# Patient Record
Sex: FEMALE | Race: BLACK OR AFRICAN AMERICAN | Employment: FULL TIME | URBAN - METROPOLITAN AREA
[De-identification: names, ages, dates, MRNs, and addresses within clinical notes are randomized per-mention and may not be internally consistent; named-entity substitution may affect disease eponyms.]

---

## 2020-08-19 ENCOUNTER — APPOINTMENT (OUTPATIENT)
Dept: PHYSICAL THERAPY | Age: 75
End: 2020-08-19

## 2020-08-19 ENCOUNTER — HOSPITAL ENCOUNTER (OUTPATIENT)
Dept: PHYSICAL THERAPY | Age: 75
Discharge: HOME | End: 2020-08-19
Payer: MEDICARE

## 2020-08-19 PROCEDURE — 97110 THERAPEUTIC EXERCISES: CPT

## 2020-08-19 PROCEDURE — 97162 PT EVAL MOD COMPLEX 30 MIN: CPT

## 2020-08-19 NOTE — PROGRESS NOTES
[address] [facility] AT [address], [address] [place] [address] Road  Phone: [phone]   [phone]  PLAN OF CARE / [address] [place] [address] PHYSICAL THERAPY SERVICES  Patient Name: Caroline Miranda : 1945   Medical   Diagnosis: Low back pain [M54.5]  Left shoulder pain [M25.512]  Left wrist pain [M25.532] Treatment Diagnosis: Low back pain [M54.5]  Left shoulder pain [M25.512]  Left wrist pain [M25.532]   Onset Date: 5 years     Referral Source: Chaim Sacks, NP Start of Care [facility]): 2020   Prior Hospitalization: See medical history Provider #: [id number]   Prior Level of Function: Unlimited tolerance for standing/walking activities. Walk 1/2 hour on TM 3 x/wk   Comorbidities: Psoriasis, arthritis, HTN, concurrent neck and left shoulder pain, BMI>30   Medications: Verified on Patient Summary List   The Plan of Care and following information is based on the information from the initial evaluation.   ===========================================================================================  Assessment / key information:  Pt is a 76y.o. year old female with subjective complaints of insidious onset of chronic b/l LE numbness/tingling. Pt states ~ 5 years ago N/T started up in her thighs and then gradually progressed down her legs so that now symptoms reside mostly in her b/l toes and feet; sx's worsen with standing/walking. Pt states occasionally sx's may progress to the point of noticing b/l LE weakness and her knees have buckled on her on one occasion. She states she has had recent MRI/CT of her l/s which was reported to be (+) for \"narrowing of the spine\". Pt states she discussed surgical intervention with her MD, however prefers to treat non-conservatively at this time. Pt denies red flags. Pt denies pain. FOTO score= 73/100 indicating 27% impairment to functional activities.     Today's evaulation is significant for     LUMBAR EXAM:  1) POSTURE/OBSERVATION: min decrease in l/s lordosis noted in standing. 2) ROM:  Lumbar AROM WNL and pain free all planes. Hip A/PROM grossly WNL. 3) STRENGTH  Hip flex R 4/5, L 4/5; ext R 4-/5, L 4-/5; abd R 3+/5, L 3+/5; Knee Ext R 5/5, L 5/5; flex R 5/5, L 5/5; Ankle DF R 5/5, L 5/5. Bridge 50% limited by weakness. Poor isometric TA in hooklying    4) SPECIAL TESTS: (-) SLUMP, SLR, GARRY, piriformis tension. 5) ADDITIONAL FINDINGS: mild hypertonicity b/l l/s paraspinals. P/A glides grade 3 lumbar spine A/P. Light touch intact b/l LE's. Repeated movement testing indicates flexion bias. These findings are supportive for diagnosis of lumbago; likely spinal stenosis. Pt will be a good candidate for skilled PT to address these impairments and promote return to normal ADLs and functional mobility for improved quality of life. *Pt has additional referrals for Left shoulder and wrist pain. Pt requests initial treatment focus primarily on her low back treatment. Will plan appropriate evaluation of left shoulder c/o when lumbar POC completed. Will recommend referral to hand specialist for Left wrist pain.    ===========================================================================================  Eval Complexity: History HIGH Complexity :3+ comorbidities / personal factors will impact the outcome/ POC ;  Examination  MEDIUM Complexity : 3 Standardized tests and measures addressing body structure, function, activity limitation and / or participation in recreation ; Presentation MEDIUM Complexity : Evolving with changing characteristics ;   Decision Making MEDIUM Complexity : FOTO score of 26-74; Overall Complexity MEDIUM  Problem List: pain affecting function, decrease ROM, decrease strength, impaired gait/ balance, decrease ADL/ functional abilitiies, decrease activity tolerance, decrease flexibility/ joint mobility and decrease transfer abilities   Treatment Plan may include any combination of the following: Therapeutic exercise, Therapeutic activities, Neuromuscular re-education, Physical agent/modality, Gait/balance training, Manual therapy, Patient education, Self Care training and Functional mobility training  Patient / Family readiness to learn indicated by: asking questions, trying to perform skills and interest  Persons(s) to be included in education: patient (P)  Barriers to Learning/Limitations: None  Measures taken, if barriers to learning:    Patient Goal (s): \"being able to walk\"   Patient self reported health status: good  Rehabilitation Potential: good   Short Term Goals: To be accomplished in  2  weeks:  1. Pt will be educated in appropriate HEP to decrease pain, increase ROM, increase strength and return pt to PLOF. 2. Pt will demo good isometric TA in H/L for improved axial stability with standing/walking activities. 3.  Pt will report at least 25% improvement in frequency/intensity of LE radicular symptoms with ADL's.  Long Term Goals: To be accomplished in  4-6  weeks:  1. Pt will improve FOTO score to >/= 77 to demo a significant improvement in functional activity tolerance. 2. Pt will achieve >/= +5 GROC in order to promote increased activity tolerance. 3. Patient will report at least 50% functional improvement with standing, walking ADL's for improved ease to resume recreational walking activity. Frequency / Duration:   Patient to be seen  1-2  times per week for 4-6  weeks:  Patient / Caregiver education and instruction: self care, activity modification and exercises  Therapist Signature: Aleena Pierre, PT Date: 7/97/2250   Certification Period: 08/19/20 to 11/17/2020 Time: 9:41 AM   ===========================================================================================  I certify that the above Physical Therapy Services are being furnished while the patient is under my care.   I agree with the treatment plan and certify that this therapy is necessary. Physician Signature:        Date:       Time:     Please sign and return to [facility] UNIT or you may fax the signed copy to [phone]. Thank you.

## 2020-08-19 NOTE — PROGRESS NOTES
PHYSICAL THERAPY - DAILY TREATMENT NOTE    Patient Name: Inés Farrell        Date: 2020  : 1945   yes Patient  Verified  Visit #:   1     Insurance: Payor: Alfa Foster / Plan: [address] [place] [address] HMO / Product Type: Managed Care Medicare /      In time: 1:50 PM Out time: 2:48 PM   Total Treatment Time: 58     Medicare/BC Time Tracking (below)   Total Timed Codes (min):  58 1:1 Treatment Time:  58     TREATMENT AREA =  Low back pain [M54.5]  Left shoulder pain [M25.512]  Left wrist pain [M25.532]    SUBJECTIVE  Pain Level (on 0 to 10 scale):  0  / 10   Medication Changes/New allergies or changes in medical history, any new surgeries or procedures?    no  If yes, update Summary List   Subjective Functional Status/Changes:  []  No changes reported     See POC          OBJECTIVE    See exam on chart for details on objective findings          15 min Therapeutic Exercise:  [x]  See flow sheet   Rationale:      increase ROM, increase strength and improve coordination to improve the patients ability to tolerate standing/walking activities. Billed With/As:   [x] TE   [] TA   [] Neuro   [] Self Care Patient Education: [x] Review HEP    [] Progressed/Changed HEP based on:   [] positioning   [] body mechanics   [] transfers   [] heat/ice application    [] other:      Other Objective/Functional Measures:    Review HEP, handout created and issued to pt. as per chart. Pt educated in spinal anatomy, function and dysfunction as related to diagnosis Reviewed POC and goals. Pt reports understanding.        Post Treatment Pain Level (on 0 to 10) scale:   0  / 10     ASSESSMENT  Assessment/Changes in Function:     See POC     []  See Progress Note/Recertification   Patient will continue to benefit from skilled PT services to modify and progress therapeutic interventions, address functional mobility deficits, address ROM deficits, address strength deficits, analyze and address soft tissue restrictions, analyze and cue movement patterns, analyze and modify body mechanics/ergonomics, assess and modify postural abnormalities and instruct in home and community integration to attain remaining goals. Progress toward goals / Updated goals:    See POC     PLAN  []  Upgrade activities as tolerated yes Continue plan of care   []  Discharge due to :    []  Other:      Therapist: Soham Cortes.  Francisco Hannon, PT    Date: 8/19/2020 Time: 9:40 AM     Future Appointments   Date Time Provider Felicitas Macdonald   8/25/2020  3:30 PM Ida Leger, PT [facility]   8/28/2020  2:00 PM Ana María Flor, PTA [facility]   8/31/2020  1:45 PM Ida Mayo., PT [facility]   9/2/2020  2:45 PM Ida Leger, PT [facility]

## 2020-08-24 ENCOUNTER — APPOINTMENT (OUTPATIENT)
Dept: PHYSICAL THERAPY | Age: 75
End: 2020-08-24

## 2020-08-25 ENCOUNTER — HOSPITAL ENCOUNTER (OUTPATIENT)
Dept: PHYSICAL THERAPY | Age: 75
Discharge: HOME | End: 2020-08-25
Payer: MEDICARE

## 2020-08-25 PROCEDURE — 97110 THERAPEUTIC EXERCISES: CPT

## 2020-08-25 NOTE — PROGRESS NOTES
PHYSICAL THERAPY - DAILY TREATMENT NOTE    Patient Name: South Spann        Date: 2020  : 1945   yes Patient  Verified  Visit #:   2   of     Insurance: Payor: Jeb Gitelman / Plan: [address] [place] [address] HMO / Product Type: Managed Care Medicare /      In time: 3:30 PM Out time: 4:25   Total Treatment Time: 55     Medicare/[facility] Time Tracking (below)   Total Timed Codes (min):  55 1:1 Treatment Time:  55     TREATMENT AREA =  Low back pain [M54.5]  Left shoulder pain [M25.512]  Left wrist pain [M25.532]    SUBJECTIVE  Pain Level (on 0 to 10 scale):  0  / 10;  Reports N/T to b/l feet 4/10   Medication Changes/New allergies or changes in medical history, any new surgeries or procedures?    no  If yes, update Summary List   Subjective Functional Status/Changes:  []  No changes reported     Pt states performing HEP 1-2 x/day without noting any change to sx's. OBJECTIVE      55 min Therapeutic Exercise:  [x]  See flow sheet   Rationale:      increase ROM, increase strength and improve coordination to improve the patients ability to tolerate standing/walking activities. Billed With/As:   [x] TE   [] TA   [] Neuro   [] Self Care Patient Education: [x] Review HEP    [] Progressed/Changed HEP based on:   [] positioning   [] body mechanics   [] transfers   [] heat/ice application    [] other:      Other Objective/Functional Measures:  -pt brings MRI reports (see chart copy) indicating mod/severe stenosis to lower lumbar levels. -new exercises added as per flow sheet with vc's provided for form/technique 100% of the time. -poor tolerance for recumbent bike with c/o fatigue after 1 min 40\"  -poor TA requiring heavy vc's to encourage muscle activation         Post Treatment Pain Level (on 0 to 10) scale:   0  / 10     ASSESSMENT  Assessment/Changes in Function:     Pt reports improved N/T to b/l feet after exercises indicating positive response to flexion bias exercises. After training, pt demonstrating improved TA activation, however continues to require supervision for appropriate muscle recruitment and performance. []  See Progress Note/Recertification   Patient will continue to benefit from skilled PT services to modify and progress therapeutic interventions, address functional mobility deficits, address ROM deficits, address strength deficits, analyze and address soft tissue restrictions, analyze and cue movement patterns, analyze and modify body mechanics/ergonomics, assess and modify postural abnormalities and instruct in home and community integration to attain remaining goals. Progress toward goals / Updated goals:    1st f/u; no significant changes yet     PLAN  []  Upgrade activities as tolerated yes Continue plan of care   []  Discharge due to :    []  Other:      Therapist: Edy Delgado.  Aby Nino, PT    Date: 8/25/2020 Time: 4:27 PM      Future Appointments   Date Time Provider Felicitas Macdonald   8/25/2020  3:30 PM Gar Halsted., PT [facility]   8/28/2020  2:00 PM Mana Cruz, PTA [facility]   8/31/2020  1:45 PM Gar Halsted., PT [facility]   9/2/2020  2:45 PM Gar Halsted., PT [facility]

## 2020-08-28 ENCOUNTER — HOSPITAL ENCOUNTER (OUTPATIENT)
Dept: PHYSICAL THERAPY | Age: 75
Discharge: HOME | End: 2020-08-28
Payer: MEDICARE

## 2020-08-28 PROCEDURE — 97110 THERAPEUTIC EXERCISES: CPT

## 2020-08-28 NOTE — PROGRESS NOTES
PT DAILY TREATMENT NOTE     Patient Name: Inés Farrell  Date:2020  : 1945  [x]  Patient  Verified  Payor: Alfa Foster / Plan: [address] [place] [address] HMO / Product Type: Managed Care Medicare /    In time:2:00  Out time:2:45  Total Treatment Time (min): 45  Total Timed Codes (min): 45  1:1 Treatment Time (min): 45   Visit #: 3 of     Treatment Area: Low back pain [M54.5]  Left shoulder pain [M25.512]  Left wrist pain [M25.532]    SUBJECTIVE  Pain Level (0-10 scale): 0  Any medication changes, allergies to medications, adverse drug reactions, diagnosis change, or new procedure performed?: [x] No    [] Yes (see summary sheet for update)  Subjective functional status/changes:   [] No changes reported  I keep telling everybody that I don't have any pain, it's just the numbness in my toes and it goes into my feet when I do a lot of walking. OBJECTIVE      45 min Therapeutic Exercise:  [x] See flow sheet :   Rationale: increase ROM and increase strength to improve the patients ability to improve functional abilities            min Patient Education: [x] Review HEP    [] Progressed/Changed HEP based on:   [] positioning   [] body mechanics   [] transfers   [] heat/ice application        Other Objective/Functional Measures:     Pain Level (0-10 scale) post treatment: 0    ASSESSMENT/Changes in Function: Pt presented with chief c/o continued LE digit numbness/paresthesia which radiates into her feet with prolonged standing/walking. Pt was able to advance to level 1 dead bug stabs with min to mod challenge today. Will continue to progress/advance patient within current POC as tolerated with monitoring symptoms.     Patient will continue to benefit from skilled PT services to modify and progress therapeutic interventions, address functional mobility deficits, address ROM deficits, address strength deficits, analyze and cue movement patterns, analyze and modify body mechanics/ergonomics, assess and modify postural abnormalities and instruct in home and community integration to attain remaining goals. []  See Plan of Care  []  See progress note/recertification  []  See Discharge Summary         Progress towards goals / Updated goals: · Short Term Goals: To be accomplished in  2  weeks:  1. Pt will be educated in appropriate HEP to decrease pain, increase ROM, increase strength and return pt to PLOF.8/28/20: Met    2. Pt will demo good isometric TA in H/L for improved axial stability with standing/walking activities. 3.  Pt will report at least 25% improvement in frequency/intensity of LE radicular symptoms with ADL's.      · Long Term Goals: To be accomplished in  4-6  weeks:  1. Pt will improve FOTO score to >/= 77 to demo a significant improvement in functional activity tolerance. 2. Pt will achieve >/= +5 GROC in order to promote increased activity tolerance. 3. Patient will report at least 50% functional improvement with standing, walking ADL's for improved ease to resume recreational walking activity.     PLAN  [x]  Upgrade activities as tolerated     []  Continue plan of care  []  Update interventions per flow sheet       []  Discharge due to:_  []  Other:_      Miles Harris, PTA 8/28/2020  2:06 PM      Future Appointments   Date Time Provider Fleicitas Macdonald   8/31/2020  1:45 PM Hammad Wise, PT [facility]   9/2/2020  2:45 PM Michi GALLEGOS, PT [facility]

## 2020-08-31 ENCOUNTER — HOSPITAL ENCOUNTER (OUTPATIENT)
Dept: PHYSICAL THERAPY | Age: 75
Discharge: HOME | End: 2020-08-31
Payer: MEDICARE

## 2020-08-31 PROCEDURE — 97110 THERAPEUTIC EXERCISES: CPT

## 2020-08-31 NOTE — PROGRESS NOTES
PT DAILY TREATMENT NOTE     Patient Name: Ruth Osorio  Date:2020  : 1945  [x]  Patient  Verified  Payor: Marbella Person / Plan: [address] [place] [address] HMO / Product Type: Managed Care Medicare /    In time:  1:44 PM  Out time:  2:30  Total Treatment Time (min): 46  Total Timed Codes (min): 46  1:1 Treatment Time (min): 46   Visit #: 4 of     Treatment Area: Low back pain [M54.5]  Left shoulder pain [M25.512]  Left wrist pain [M25.532]    SUBJECTIVE  Pain Level (0-10 scale): 0/10  Any medication changes, allergies to medications, adverse drug reactions, diagnosis change, or new procedure performed?: [x] No    [] Yes (see summary sheet for update)  Subjective functional status/changes:   [] No changes reported  \"I just get the numbness in my feet when I do too much walking\". States she did a little of her HEP at work. \"I did a lot of walking at work\"        OBJECTIVE    46 min Therapeutic Exercise:  [x] See flow sheet :   Rationale: increase ROM and increase strength to improve the patients ability to improve functional abilities            min Patient Education: [x] Review HEP    [] Progressed/Changed HEP based on:   [] positioning   [] body mechanics   [] transfers   [] heat/ice application        Other Objective/Functional Measures:   -added SB pelvic rocks and marches (with slight PPT)  -progressed PPT supine to [place] MB flexion  -progressed iso TA to RSB  -updated HEP handout (see chart copy)    Pain Level (0-10 scale) post treatment: 0 / 10    ASSESSMENT/Changes in Function: Pt demonstrating improving lumbo-pelvic stability with PPT as per ability to advance with exercises as per above. Pt demonstrating Fair TA with iso's.   Pt reporting no N/T to toes after treatment; pt re-ed on importance of compliance with HEP to promote carryover    Patient will continue to benefit from skilled PT services to modify and progress therapeutic interventions, address functional mobility deficits, address ROM deficits, address strength deficits, analyze and cue movement patterns, analyze and modify body mechanics/ergonomics, assess and modify postural abnormalities and instruct in home and community integration to attain remaining goals. []  See Plan of Care  []  See progress note/recertification  []  See Discharge Summary         Progress towards goals / Updated goals: · Short Term Goals: To be accomplished in  2  weeks:  1. Pt will be educated in appropriate HEP to decrease pain, increase ROM, increase strength and return pt to PLOF.8/28/20: Met    2. Pt will demo good isometric TA in H/L for improved axial stability with standing/walking activities. 3.  Pt will report at least 25% improvement in frequency/intensity of LE radicular symptoms with ADL's.      · Long Term Goals: To be accomplished in  4-6  weeks:  1. Pt will improve FOTO score to >/= 77 to demo a significant improvement in functional activity tolerance. 2. Pt will achieve >/= +5 GROC in order to promote increased activity tolerance. 3. Patient will report at least 50% functional improvement with standing, walking ADL's for improved ease to resume recreational walking activity. PLAN  [x]  Upgrade activities as tolerated     []  Continue plan of care  []  Update interventions per flow sheet       []  Discharge due to:_  []  Other:_      Pro Pierre, PT 8/31/2020  2:31 PM       Future Appointments   Date Time Provider Felicitas Macdonald   9/2/2020  2:45 PM Kailey Dewey, PT [facility]

## 2020-09-02 ENCOUNTER — HOSPITAL ENCOUNTER (OUTPATIENT)
Dept: PHYSICAL THERAPY | Age: 75
Discharge: HOME | End: 2020-09-02
Payer: MEDICARE

## 2020-09-02 PROCEDURE — 97110 THERAPEUTIC EXERCISES: CPT

## 2020-09-02 NOTE — PROGRESS NOTES
PT DAILY TREATMENT NOTE     Patient Name: Shelia Glynn  Date:2020  : 1945  [x]  Patient  Verified  Payor: Wagner Naina / Plan: [address] [place] [address] HMO / Product Type: Managed Care Medicare /    In time:  2:51 PM  Out time:  3:36  Total Treatment Time (min): 45  Total Timed Codes (min): 45  1:1 Treatment Time (min): 45   Visit #: 5 of     Treatment Area: Low back pain [M54.5]  Left shoulder pain [M25.512]  Left wrist pain [M25.532]    SUBJECTIVE  Pain Level (0-10 scale): 0 /10;  N/T to feet 4/10  Any medication changes, allergies to medications, adverse drug reactions, diagnosis change, or new procedure performed?: [x] No    [] Yes (see summary sheet for update)  Subjective functional status/changes:   [] No changes reported    \"My back bothered me for a couple of hours after the last session\". Pt unsure how long N/T was improved for after her last session. Pt states no pain with her HEP; states she has an improvement in her N/T to feet afterwards. \"It is getting better b/c I used to have to wear socks to bed b/c the tingling was so bad but I haven't had to do that lately\". OBJECTIVE    45 min Therapeutic Exercise:  [x] See flow sheet :   Rationale: increase ROM and increase strength to improve the patients ability to improve functional abilities            min Patient Education: [x] Review HEP    [] Progressed/Changed HEP based on:   [] positioning   [] body mechanics   [] transfers   [] heat/ice application        Other Objective/Functional Measures:   -added SB obliques  -min cues for form with TA heel slides and TA engagement with Dead bug      Pain Level (0-10 scale) post treatment:  0 / 10 (pain and N/T)    ASSESSMENT/Changes in Function: Pt demonstrating improving core strength/stability with H/L exercises. Pt able to abolish radicular sx's with flexion based exercises and encouraged to continue HEP compliance and stretches to manage sx return.   Pt ed to make note of how long benefit from treatment lasts. Patient will continue to benefit from skilled PT services to modify and progress therapeutic interventions, address functional mobility deficits, address ROM deficits, address strength deficits, analyze and cue movement patterns, analyze and modify body mechanics/ergonomics, assess and modify postural abnormalities and instruct in home and community integration to attain remaining goals. []  See Plan of Care  []  See progress note/recertification  []  See Discharge Summary         Progress towards goals / Updated goals: · Short Term Goals: To be accomplished in  2  weeks:  1. Pt will be educated in appropriate HEP to decrease pain, increase ROM, increase strength and return pt to PLOF.8/28/20: Met    2. Pt will demo good isometric TA in H/L for improved axial stability with standing/walking activities. -9/2: goal met; good isometric TA in H/L  3.  Pt will report at least 25% improvement in frequency/intensity of LE radicular symptoms with ADL's.      · Long Term Goals: To be accomplished in  4-6  weeks:  1. Pt will improve FOTO score to >/= 77 to demo a significant improvement in functional activity tolerance. 2. Pt will achieve >/= +5 GROC in order to promote increased activity tolerance. 3. Patient will report at least 50% functional improvement with standing, walking ADL's for improved ease to resume recreational walking activity. PLAN  [x]  Upgrade activities as tolerated     []  Continue plan of care  []  Update interventions per flow sheet       []  Discharge due to:_  []  Other:_      Shaista Pierre, PT 9/2/2020  3:38 PM        Future Appointments   Date Time Provider Felicitas Macdonald   9/8/2020  2:00 PM [facility] PT CH[place]ED [place] 2 [facility]   9/11/2020  2:00 PM [place]  [facility]   9/14/2020  2:30 PM Bradyen Love, PT [facility]   9/16/2020  2:15 PM Jonelle Sprain, PTA [facility]

## 2020-09-08 ENCOUNTER — HOSPITAL ENCOUNTER (OUTPATIENT)
Dept: PHYSICAL THERAPY | Age: 75
Discharge: HOME | End: 2020-09-08
Payer: MEDICARE

## 2020-09-08 PROCEDURE — 97110 THERAPEUTIC EXERCISES: CPT | Performed by: GENERAL ACUTE CARE HOSPITAL

## 2020-09-08 NOTE — PROGRESS NOTES
PT DAILY TREATMENT NOTE     Patient Name: Hiral Martinez  Date:2020  : 1945  [x]  Patient  Verified  Payor: Mark Ramachandran / Plan: [address] [place] [address] HMO / Product Type: Managed Care Medicare /    In time: 1:55 Out time:  2:40   Total Treatment Time (min): 45   Total Timed Codes (min): 45   1:1 Treatment Time (min): 45   Visit #: 6 of     Treatment Area: Low back pain [M54.5]  Left shoulder pain [M25.512]  Left wrist pain [M25.532]    SUBJECTIVE  Pain Level (0-10 scale): 0 /10;  N/T to feet 2/10   Any medication changes, allergies to medications, adverse drug reactions, diagnosis change, or new procedure performed?: [x] No    [] Yes (see summary sheet for update)  Subjective functional status/changes:   [] No changes reported  Pt reports positive response to PT for reduction of N/T sensation. Still unsure how long benefit from treatment lasts - \"I only notice they aren't tingling when someone asks me about it\"    OBJECTIVE    45 min Therapeutic Exercise:  [x] See flow sheet :   Rationale: increase ROM and increase strength to improve the patients ability to improve functional abilities            min Patient Education: [x] Review HEP    [] Progressed/Changed HEP based on:   Educated on walking/standing every 20-30 minutes while at work. Other Objective/Functional Measures:   -added 2# ankle weights with seated and standing march  -added clamshells  -inc repetitions as able  -VC's for TA engagment with seated SB exercises     Pain Level (0-10 scale) post treatment:  0 / 10 (pain and N/T)     ASSESSMENT/Changes in Function: Pt continues to demo (+) response to flexion based exercises to abolish radicular N/T symptoms. Fatigued easily with addition of hip abduction strengthening; will continue to benefit from ongoing progression of hip and core strengthening for axial stabilization.  Pt requested to reduced frequency to 1x/wk due to financial reasons now that pain has improved- therapist in agreement. Patient will continue to benefit from skilled PT services to modify and progress therapeutic interventions, address functional mobility deficits, address ROM deficits, address strength deficits, analyze and cue movement patterns, analyze and modify body mechanics/ergonomics, assess and modify postural abnormalities and instruct in home and community integration to attain remaining goals. []  See Plan of Care  []  See progress note/recertification  []  See Discharge Summary         Progress towards goals / Updated goals: · Short Term Goals: To be accomplished in  2  weeks:  1. Pt will be educated in appropriate HEP to decrease pain, increase ROM, increase strength and return pt to PLOF.8/28/20: Met    2. Pt will demo good isometric TA in H/L for improved axial stability with standing/walking activities. -9/2: goal met; good isometric TA in H/L  3.  Pt will report at least 25% improvement in frequency/intensity of LE radicular symptoms with ADL's.      · Long Term Goals: To be accomplished in  4-6  weeks:  1. Pt will improve FOTO score to >/= 77 to demo a significant improvement in functional activity tolerance. 2. Pt will achieve >/= +5 GROC in order to promote increased activity tolerance. 3. Patient will report at least 50% functional improvement with standing, walking ADL's for improved ease to resume recreational walking activity.     PLAN  [x]  Upgrade activities as tolerated     []  Continue plan of care  []  Update interventions per flow sheet       []  Discharge due to:_  []  Other:_      Melinda Cruz, PT 9/8/2020  3:38 PM        Future Appointments   Date Time Provider Felicitas Macdonald   9/8/2020  2:00 PM [facility] PT [place] 2 [facility]   9/11/2020  2:00 PM Kwasi Daley, PTA [facility]   9/14/2020  2:30 PM Alan Thomas, PT [facility]   9/16/2020  2:15 PM Ethmorena Daley, PTA [facility]

## 2020-09-11 ENCOUNTER — APPOINTMENT (OUTPATIENT)
Dept: PHYSICAL THERAPY | Age: 75
End: 2020-09-11
Payer: MEDICARE

## 2020-09-14 ENCOUNTER — APPOINTMENT (OUTPATIENT)
Dept: PHYSICAL THERAPY | Age: 75
End: 2020-09-14
Payer: MEDICARE

## 2020-09-16 ENCOUNTER — APPOINTMENT (OUTPATIENT)
Dept: PHYSICAL THERAPY | Age: 75
End: 2020-09-16
Payer: MEDICARE

## 2020-09-23 ENCOUNTER — HOSPITAL ENCOUNTER (OUTPATIENT)
Dept: PHYSICAL THERAPY | Age: 75
Discharge: HOME | End: 2020-09-23
Payer: MEDICARE

## 2020-09-23 PROCEDURE — 97110 THERAPEUTIC EXERCISES: CPT

## 2020-09-23 NOTE — PROGRESS NOTES
[facility] PHYSICAL THERAPY  [facility], [place], [address] - Phone: [phone]  Fax: [phone]   [facility]          Patient Name: Corey Husain : 1945   Treatment/Medical Diagnosis: Low back pain [M54.5]  Left shoulder pain [M25.512]  Left wrist pain [M25.532]   Onset Date: 5 years    Referral Source: Leonidas Santamaria NP Start of Care [facility]): 2020   Prior Hospitalization: See Medical History Provider #: [id number]   Prior Level of Function: Unlimited tolerance for standing/walking activities. Walk 1/2 hour on TM 3 x/wk   Comorbidities: Psoriasis, arthritis, HTN, concurrent neck and left shoulder pain, BMI>30   Medications: Verified on Patient Summary List   Vis[facility] from [facility]: 7 Missed Visits: 1     Goal/Measure of Progress Goal Met? 1. Pt will be educated in appropriate HEP to decrease pain, increase ROM, increase strength and return pt to PLOF   Status at last Eval: Progressing  Current Status: Met yes   2. Pt will demo good isometric TA in H/L for improved axial stability with standing/walking activities. Status at last Eval: Progressing  Current Status: Met yes   3. Pt will report at least 25% improvement in frequency/intensity of LE radicular symptoms with ADL's. Status at last Eval: Progressing  Current Status: 30% improvement reported yes     4. Pt will improve FOTO score to >/= 77 to demo a significant improvement in functional activity tolerance. Status at last Eval: 73/100 Current Status: 55/100 no   2. Pt will achieve >/= +5 GROC in order to promote increased activity tolerance. Status at last Eval: Progressing  Current Status: Current GROC score= +4  no   3. Patient will report at least 50% functional improvement with standing, walking ADL's for improved ease to resume recreational walking activity.    Status at last Eval: Progressing  Current Status: 40% improvement reported no     Key Functional Changes/Progress: Patient reports approximately 40% overall improvement from therapy since initial evaluation with 4/10 pain level at the worst intermittently with no specific change/increase in activity. Pt however presents with chief c/o continued isolated residual paresthesia/tingling bilateral toes radiating to the soles of her feet with prolonged walking >5 minutes. Pt would benefit from continued therapy for 8 additional visits to achieve maximum medical benefit from current POC. Will continue to progress/advance patient within current POC as tolerated with monitoring symptoms. Problem List: pain affecting function, decrease ROM, decrease strength, impaired gait/ balance, decrease ADL/ functional abilitiies, decrease activity tolerance, decrease flexibility/ joint mobility and decrease transfer abilities   Treatment Plan may include any combination of the following: Therapeutic exercise, Therapeutic activities, Neuromuscular re-education, Physical agent/modality, Gait/balance training, Manual therapy, Patient education, Self Care training and Functional mobility training  Patient Goal(s) has been updated and includes:  \"I would like to be able to walk for longer with less tingling in my feet and toes\"     Goals for this certification period include and are to be achieved in   8  treatments: 1. Pt will report at least 50-75% improvement in frequency/intensity of LE radicular symptoms with ADL's. 2.Pt will improve FOTO score to >/= 77 to demo a significant improvement in functional activity tolerance. 3.Pt will achieve >/= +5 GROC in order to promote increased activity tolerance. 4.Patient will report at least 50% functional improvement with standing, walking ADL's for improved ease to resume recreational walking activity.     Frequency / Duration:   Patient to be seen   1  times per week for   8    treatments:    Assessments/Recommendations:   Continue with current POC for 8 additional visits with advancing as tolerated, then reassess for the need for continuation or discharge from therapy. If you have any questions/comments please contact us directly at [phone]. Thank you for allowing us to assist in the care of your patient. Therapist Signature: Ivan Gasca PTA/  MOHINDER Dickerson Date: 8/08/2276   Certification Period:  Reporting Period: 08/19/20 to 11/17/2020 08/19/20 to 09/23/20 Time: 2:24 PM   NOTE TO PHYSICIAN:  PLEASE COMPLETE THE ORDERS BELOW AND FAX TO   [facility] Physical Therapy: [phone]  If you are unable to process this request in 24 hours please contact our office: [phone]    ___ I have read the above report and request that my patient continue as recommended.   ___ I have read the above report and request that my patient continue therapy with the following changes/special instructions: ________________________________________________   ___ I have read the above report and request that my patient be discharged from therapy.      Physician Signature:        Date:       Time:

## 2020-10-01 ENCOUNTER — HOSPITAL ENCOUNTER (OUTPATIENT)
Dept: PHYSICAL THERAPY | Age: 75
Discharge: HOME | End: 2020-10-01
Payer: MEDICARE

## 2020-10-01 PROCEDURE — 97110 THERAPEUTIC EXERCISES: CPT

## 2020-10-01 NOTE — PROGRESS NOTES
PT DAILY TREATMENT NOTE     Patient Name: Marlon Fees  Date:10/1/2020  : 1945  [x]  Patient  Verified  Payor: Ifrah Fonseca / Plan: [address] [place] [address] HMO / Product Type: Managed Care Medicare /    In time:  9:21 AM  Out time:  10:25  Total Treatment Time (min): 64  Total Timed Codes (min): 64  1:1 Treatment Time (min): 54  Visit #: 8 of     Treatment Area: Low back pain [M54.5]  Left shoulder pain [M25.512]  Left wrist pain [M25.532]    SUBJECTIVE  Pain Level (0-10 scale): 0  Any medication changes, allergies to medications, adverse drug reactions, diagnosis change, or new procedure performed?: [x] No    [] Yes (see summary sheet for update)  Subjective functional status/changes:   [] No changes reported  \"The tingling is much better\". Pt states she has no sx's to foot/toes for the majority of the day. Able to perform HEP to reduce sx's if they are aggravated. Pt states she has had some chest pain for the past 2 days; saw MD in office 2 days ago and cleared for heart function with xray and EKG. Pt states sx's related to indigestion. OBJECTIVE      54  (64) min Therapeutic Exercise:  [x] See flow sheet :   Rationale: increase ROM and increase strength to improve the patients ability to improve functional abilities            min Patient Education: [x] Review HEP    [] Progressed/Changed HEP based on:   [] positioning   [] body mechanics   [] transfers   [] heat/ice application        Other Objective/Functional Measures:   -/98 mmHg; HR 81,  pre treatment.    -cued to maintain TA contraction with SB stability and standing marches  -cued to maintain PPT with OH shoulder flexion and TA heel slides  -cued to maintain neutral LE alignment with supine bridges (falling into ER)  -cues for technique with Lv2 dead bug.     Pain Level (0-10 scale) post treatment: 0/10    ASSESSMENT/Changes in Function:  Pt continues to require skilled monitoring and cues to perform most exercises correctly. Pt reporting fatigue but no adverse sx's afterwards indicating appropriate intensity for core strengthening. Plan to continue advancement as appropriate to attain LTG's. Pt reporting good improvement in radicular sx's today. Patient will continue to benefit from skilled PT services to modify and progress therapeutic interventions, address functional mobility deficits, address ROM deficits, address strength deficits, analyze and cue movement patterns, analyze and modify body mechanics/ergonomics, assess and modify postural abnormalities and address imbalance/dizziness to attain remaining goals. []  See Plan of Care  [x]  See progress note/recertification  []  See Discharge Summary         Progress towards goals / Updated goals:  No significant changes from PN completed last session  · Goals for this certification period include and are to be achieved in   8  treatments: 1. Pt will report at least 50-75% improvement in frequency/intensity of LE radicular symptoms with ADL's.   2.Pt will improve FOTO score to >/= 77 to demo a significant improvement in functional activity tolerance. 3.Pt will achieve >/= +5 GROC in order to promote increased activity tolerance. 4.Patient will report at least 50% functional improvement with standing, walking ADL's for improved ease to resume recreational walking activity. PLAN  [x]  Upgrade activities as tolerated     []  Continue plan of care  []  Update interventions per flow sheet       []  Discharge due to:_  []  Other:_      Eli Pierre, PT 10/1/2020  1:16 PM       Future Appointments   Date Time Provider Felicitas Macdonald   10/1/2020  9:15 AM Tiffany Polanco, PT [facility]   10/7/2020  3:00 PM Sue Haddad PTA [facility]

## 2020-10-07 ENCOUNTER — HOSPITAL ENCOUNTER (OUTPATIENT)
Dept: PHYSICAL THERAPY | Age: 75
Discharge: HOME | End: 2020-10-07
Payer: MEDICARE

## 2020-10-07 PROCEDURE — 97110 THERAPEUTIC EXERCISES: CPT

## 2020-10-07 NOTE — PROGRESS NOTES
PT DAILY TREATMENT NOTE     Patient Name: Carole Durham  Date:10/7/2020  : 1945  [x]  Patient  Verified  Payor: Yuri Champagne / Plan: [address] [place] [address] HMO / Product Type: Managed Care Medicare /    In time:2:57  Out time:3:52  Total Treatment Time (min): 55  Total Timed Codes (min): 55  1:1 Treatment Time (min): 48   Visit #: 9 of     Treatment Area: Low back pain [M54.5]  Left shoulder pain [M25.512]  Left wrist pain [M25.532]    SUBJECTIVE  Pain Level (0-10 scale): 0  Any medication changes, allergies to medications, adverse drug reactions, diagnosis change, or new procedure performed?: [x] No    [] Yes (see summary sheet for update)  Subjective functional status/changes:   [] No changes reported  The numbness in my feet actually stopped for a day or two since I was here last, but it started to come back when I didn't do the exercises when I got that pain in my chest. The doctor did a stress test and EKG just to make sure nothing was going on with my heart and the determined that it was a pulled muscle in my chest.      OBJECTIVE      55 min Therapeutic Exercise:  [x] See flow sheet :   Rationale: increase ROM and increase strength to improve the patients ability to improve functional abilities            min Patient Education: [x] Review HEP    [] Progressed/Changed HEP based on:   [] positioning   [] body mechanics   [] transfers   [] heat/ice application        Other Objective/Functional Measures:     Pain Level (0-10 scale) post treatment: 0    ASSESSMENT/Changes in Function: Pt presents with the most functional improvement with a short term period of relief with all distal foot/toe numbness/paresthesia abolished for 2-3 days with performing HEP since last treatment.  Pt has difficulty tolerating L UE overhead AROM with therex today due to recently strained L pectoral muscle, but was able to tolerate increased resistance with mini band clamshells with moderate challenge today.Will continue to progress/advance patient within current POC as tolerated with monitoring symptoms. Patient will continue to benefit from skilled PT services to modify and progress therapeutic interventions, address functional mobility deficits, address ROM deficits, address strength deficits, analyze and address soft tissue restrictions, analyze and cue movement patterns, analyze and modify body mechanics/ergonomics, assess and modify postural abnormalities and instruct in home and community integration to attain remaining goals. []  See Plan of Care  []  See progress note/recertification  []  See Discharge Summary         Progress towards goals / Updated goals:  · Goals for this certification period include and are to be achieved in   8  treatments: 1. Pt will report at least 50-75% improvement in frequency/intensity of LE radicular symptoms with ADL's.10/7/20: Met, Pt reports approximately 60% improvement in frequency/intensity of LE radicular symptoms with ADL's. 2.Pt will improve FOTO score to >/= 77 to demo a significant improvement in functional activity tolerance. 3.Pt will achieve >/= +5 GROC in order to promote increased activity tolerance. 4.Patient will report at least 50% functional improvement with standing, walking ADL's for improved ease to resume recreational walking activity.     PLAN  [x]  Upgrade activities as tolerated     []  Continue plan of care  []  Update interventions per flow sheet       []  Discharge due to:_  []  Other:_      Diego Lorenz PTA 10/7/2020  3:10 PM      Future Appointments   Date Time Provider Felicitas Macdonald   10/14/2020  3:00 PM Marge Denny PTA [facility]

## 2020-10-14 ENCOUNTER — HOSPITAL ENCOUNTER (OUTPATIENT)
Dept: PHYSICAL THERAPY | Age: 75
Discharge: HOME | End: 2020-10-14
Payer: MEDICARE

## 2020-10-14 PROCEDURE — 97110 THERAPEUTIC EXERCISES: CPT

## 2020-10-14 NOTE — PROGRESS NOTES
PT DAILY TREATMENT NOTE     Patient Name: Hetal Cheema  Date:10/14/2020  : 1945  [x]  Patient  Verified  Payor: Agustina Morales / Plan: [address] [place] [address] HMO / Product Type: Managed Care Medicare /    In time:3:02  Out time:4:05  Total Treatment Time (min): 63  Total Timed Codes (min): 63  1:1 Treatment Time (min): 43   Visit #: 10 of     Treatment Area: Low back pain [M54.5]  Left shoulder pain [M25.512]  Left wrist pain [M25.532]    SUBJECTIVE  Pain Level (0-10 scale): 0  Any medication changes, allergies to medications, adverse drug reactions, diagnosis change, or new procedure performed?: [x] No    [] Yes (see summary sheet for update)  Subjective functional status/changes:   [] No changes reported  I had some numbness in the bottom of my left foot for a couple of minutes when I was walking the other day, but I almost have to think about it when it hits me because the numbness used to be there all of the time, which is good    OBJECTIVE      63 min Therapeutic Exercise:  [x] See flow sheet :   Rationale: increase ROM and increase strength to improve the patients ability to improve functional abilities         min Patient Education: [x] Review HEP    [] Progressed/Changed HEP based on:   [] positioning   [] body mechanics   [] transfers   [] heat/ice application        Other Objective/Functional Measures:     Pain Level (0-10 scale) post treatment: 0    ASSESSMENT/Changes in Function: Pt presents with the most functional improvement with decreased frequency and intensity of distal LE numbness/paresthesia with only 1 episode in the bottom of her L foot for approximately 2-3 minutes since last treatment. Pt is on track for discharge as planned at end of current script after completing the last 2 remaining visits on current POC. Will continue to progress/advance patient within current POC as tolerated with monitoring symptoms.     Patient will continue to benefit from skilled PT services to modify and progress therapeutic interventions, address functional mobility deficits, address ROM deficits, address strength deficits, analyze and cue movement patterns, analyze and modify body mechanics/ergonomics, assess and modify postural abnormalities and instruct in home and community integration to attain remaining goals. []  See Plan of Care  []  See progress note/recertification  []  See Discharge Summary         Progress towards goals / Updated goals:  · Goals for this certification period include and are to be achieved in   8  treatments: 1. Pt will report at least 50-75% improvement in frequency/intensity of LE radicular symptoms with ADL's.10/7/20: Met, Pt reports approximately 60% improvement in frequency/intensity of LE radicular symptoms with ADL's. 2.Pt will improve FOTO score to >/= 77 to demo a significant improvement in functional activity tolerance. 3.Pt will achieve >/= +5 GROC in order to promote increased activity tolerance. 4.Patient will report at least 50% functional improvement with standing, walking ADL's for improved ease to resume recreational walking activity. 10/14/20: Met, pt reports approximately 80% improvement with standing, walking ADL's since initial evaluation    PLAN  [x]  Upgrade activities as tolerated     []  Continue plan of care  []  Update interventions per flow sheet       []  Discharge due to:_  []  Other:_      Brendan Stewart PTA 10/14/2020  3:14 PM      No future appointments.

## 2020-10-29 ENCOUNTER — HOSPITAL ENCOUNTER (OUTPATIENT)
Dept: PHYSICAL THERAPY | Age: 75
Discharge: HOME | End: 2020-10-29
Payer: MEDICARE

## 2020-10-29 PROCEDURE — 97110 THERAPEUTIC EXERCISES: CPT

## 2020-10-29 NOTE — PROGRESS NOTES
PT DAILY TREATMENT NOTE     Patient Name: Kenya Goode  Date:10/29/2020  : 1945  [x]  Patient  Verified  Payor: Oraliarobinraymond Collier / Plan: [address] [place] [address] HMO / Product Type: Managed Care Medicare /    In time:555  Out time:645  Total Treatment Time (min): 50  Total Timed Codes (min): 40  1:1 Treatment Time (min): 40  Visit #: 11 of     Treatment Area: Low back pain [M54.5]  Left shoulder pain [M25.512]  Left wrist pain [M25.532]    SUBJECTIVE  Pain Level (0-10 scale): 0  Any medication changes, allergies to medications, adverse drug reactions, diagnosis change, or new procedure performed?: [x] No    [] Yes (see summary sheet for update)  Subjective functional status/changes:   [] No changes reported  Pt reports she is doing well, she wants today to be her last day because this is very expensive  OBJECTIVE      40 min Therapeutic Exercise:  [x] See flow sheet :   Rationale: increase ROM and increase strength to improve the patients ability to improve functional abilities         min Patient Education: [x] Review HEP    [] Progressed/Changed HEP based on:   [] positioning   [] body mechanics   [] transfers   [] heat/ice application        Other Objective/Functional Measures:     Pain Level (0-10 scale) post treatment: 0    ASSESSMENT/Changes in Function:  Pt required min vc with familiar exercises to perform correctly. Her HEP was updated and she was issued green t band for home use. Pt is I c HEP and will DC today. See DC note for details    Patient will continue to benefit from skilled PT services to modify and progress therapeutic interventions, address functional mobility deficits, address ROM deficits, address strength deficits, analyze and cue movement patterns, analyze and modify body mechanics/ergonomics, assess and modify postural abnormalities and instruct in home and community integration to attain remaining goals.      []  See Plan of Care  []  See progress note/recertification  [x]  See Discharge Summary         Progress towards goals / Updated goals:  See DC  PLAN  []  Upgrade activities as tolerated     []  Continue plan of care  []  Update interventions per flow sheet       [x]  Discharge due to:_has met or made progress towards all goals  []  Other:_      Brennan Mosley 10/29/2020  3:14 PM      Future Appointments   Date Time Provider Felicitas Macdonald   10/29/2020  6:00 PM Ok Handing [facility]   11/4/2020  3:00 PM Marge Denny PTA [facility]

## 2020-10-29 NOTE — PROGRESS NOTES
[facility] PHYSICAL THERAPY  Elise Batista [address], [place], [address] - Phone: [phone]  Fax: [phone]  DISCHARGE SUMMARY FOR PHYSICAL THERAPY          Patient Name: Keenan Dance : 1945   Treatment/Medical Diagnosis: Low back pain [M54.5]  Left shoulder pain [M25.512]  Left wrist pain [M25.532]   Onset Date: 5 years    Referral Source: Danii Alvarez NP Start of Care [facility]): 2020   Prior Hospitalization: See Medical History Provider #: [id number]   Prior Level of Function: Unlimited tolerance for standing/walking activities. Walk 1/2 hour on TM 3 x/wk   Comorbidities: Psoriasis, arthritis, HTN, concurrent neck and left shoulder pain, BMI>30   Medications: Verified on Patient Summary List   Visits from [facility]: 11 Missed Visits: 2          Goal/Measure of Progress Goal Met? 1. Pt will report at least 50-75% improvement in frequency/intensity of LE radicular symptoms with ADL's. Status at last Eval: 30% Current Status: 60% yes   2. Pt will improve FOTO score to >/= 77 to demo a significant improvement in functional activity tolerance   Status at last Eval: 55 Current Status: 66 no   3. Pt will achieve >/= +5 GROC in order to promote increased activity tolerance. Status at last Eval: na Current Status: 6 yes     4. Patient will report at least 50% functional improvement with standing, walking ADL's for improved ease to resume recreational walking activity   Status at last Eval: 40% Current Status: 85% yes     Key Functional Changes/Progress: Pt reports she has about 85% improvement since starting therapy. She has been able to stand and walk better. She demonstrates improving hip and core strength which has likely contributed to overall functional improvement. At this point she has met or made progress toward all LTG, and is ready to DC today. She I with her HEP, and will likely continue to progress with adherence to HEP.     STRENGTH  Hip flex R 4+/5, L 4+/5; ext R 4/5, L 4/5; abd R 4-/5, L 4-/5; Knee Ext R 5/5, L 5/5; flex R 5/5, L 5/5; Ankle DF R 5/5, L 5/5. Bridge 60% limited by weakness. Fair isometric TA in hooklying      Assessments/Recommendations: Discontinue therapy. Progressing towards or have reached established goals. If you have any questions/comments please contact us directly at [phone]. Thank you for allowing us to assist in the care of your patient.     Therapist Signature: Ramya Walker Date: 10/29/20   Reporting Period: 8/19/20-10/29/20 Time: 3:20 PM

## 2020-11-04 ENCOUNTER — APPOINTMENT (OUTPATIENT)
Dept: PHYSICAL THERAPY | Age: 75
End: 2020-11-04

## 2025-03-19 NOTE — PROGRESS NOTES
PT DAILY TREATMENT NOTE     Patient Name: Lillard Jeans  Date:2020  : 1945  [x]  Patient  Verified  Payor: [facility] / Plan: [address] [place] [address] HMO / Product Type: Managed Care Medicare /    In time:2:16  Out time:3:20  Total Treatment Time (min): 64  Total Timed Codes (min): 64  1:1 Treatment Time (min): 45   Visit #: 7 of     Treatment Area: Low back pain [M54.5]  Left shoulder pain [M25.512]  Left wrist pain [M25.532]    SUBJECTIVE  Pain Level (0-10 scale): 0, just tingling   Any medication changes, allergies to medications, adverse drug reactions, diagnosis change, or new procedure performed?: [x] No    [] Yes (see summary sheet for update)  Subjective functional status/changes:   [] No changes reported  I don't have any pain, but the tingling in my toes is about the same and I have been getting some burning in my fingers in my left hand as well lately. OBJECTIVE      64 min Therapeutic Exercise:  [x] See flow sheet :   Rationale: increase ROM and increase strength to improve the patients ability to improve functional abilities            min Patient Education: [x] Review HEP    [] Progressed/Changed HEP based on:   [] positioning   [] body mechanics   [] transfers   [] heat/ice application        Other Objective/Functional Measures:     Pain Level (0-10 scale) post treatment: 2/10    ASSESSMENT/Changes in Function:     Patient will continue to benefit from skilled PT services to modify and progress therapeutic interventions, address functional mobility deficits, address ROM deficits, address strength deficits, analyze and cue movement patterns, analyze and modify body mechanics/ergonomics, assess and modify postural abnormalities and address imbalance/dizziness to attain remaining goals.      []  See Plan of Care  [x]  See progress note/recertification  []  See Discharge Summary         Progress towards goals / Updated goals:  See Progress note/Physician update for full detailed progress towards established goals. PLAN  [x]  Upgrade activities as tolerated     []  Continue plan of care  []  Update interventions per flow sheet       []  Discharge due to:_  []  Other:_      Omaira López, PTA 9/23/2020  2:15 PM      No future appointments. [Negative] : Heme/Lymph